# Patient Record
Sex: FEMALE | Race: WHITE | Employment: PART TIME | URBAN - NONMETROPOLITAN AREA
[De-identification: names, ages, dates, MRNs, and addresses within clinical notes are randomized per-mention and may not be internally consistent; named-entity substitution may affect disease eponyms.]

---

## 2018-03-07 ENCOUNTER — HOSPITAL ENCOUNTER (EMERGENCY)
Age: 23
Discharge: HOME | End: 2018-03-07
Payer: COMMERCIAL

## 2018-03-07 ENCOUNTER — APPOINTMENT (OUTPATIENT)
Dept: GENERAL RADIOLOGY | Age: 23
End: 2018-03-07
Payer: COMMERCIAL

## 2018-03-07 VITALS
OXYGEN SATURATION: 100 % | HEART RATE: 81 BPM | TEMPERATURE: 97.4 F | SYSTOLIC BLOOD PRESSURE: 123 MMHG | RESPIRATION RATE: 20 BRPM | DIASTOLIC BLOOD PRESSURE: 71 MMHG

## 2018-03-07 DIAGNOSIS — M77.8 FOREARM TENDONITIS: Primary | ICD-10-CM

## 2018-03-07 PROCEDURE — 73110 X-RAY EXAM OF WRIST: CPT

## 2018-03-07 PROCEDURE — 99283 EMERGENCY DEPT VISIT LOW MDM: CPT

## 2018-03-07 RX ORDER — NAPROXEN 500 MG/1
500 TABLET ORAL 2 TIMES DAILY WITH MEALS
Qty: 30 TABLET | Refills: 0 | Status: SHIPPED | OUTPATIENT
Start: 2018-03-07 | End: 2018-06-22 | Stop reason: ALTCHOICE

## 2018-03-07 ASSESSMENT — PAIN DESCRIPTION - PAIN TYPE: TYPE: ACUTE PAIN

## 2018-03-07 ASSESSMENT — PAIN DESCRIPTION - LOCATION: LOCATION: WRIST

## 2018-03-07 ASSESSMENT — PAIN DESCRIPTION - ORIENTATION: ORIENTATION: LEFT

## 2018-03-07 ASSESSMENT — PAIN SCALES - GENERAL: PAINLEVEL_OUTOF10: 5

## 2018-03-07 ASSESSMENT — ENCOUNTER SYMPTOMS: COLOR CHANGE: 0

## 2018-03-07 ASSESSMENT — PAIN DESCRIPTION - FREQUENCY: FREQUENCY: CONTINUOUS

## 2018-03-07 NOTE — ED NOTES
Patient to ED for left wrist injury. patient states last week was lifting an object and felt a pop in her wrist. Since then patient has been unable to use wrist without pain, no obvious deformities noted.      Ivis Pat RN  03/07/18 [id number]

## 2018-06-22 ENCOUNTER — HOSPITAL ENCOUNTER (EMERGENCY)
Age: 23
Discharge: HOME | End: 2018-06-22
Payer: COMMERCIAL

## 2018-06-22 ENCOUNTER — APPOINTMENT (OUTPATIENT)
Dept: CT IMAGING | Age: 23
End: 2018-06-22
Payer: COMMERCIAL

## 2018-06-22 VITALS
TEMPERATURE: 98.3 F | DIASTOLIC BLOOD PRESSURE: 80 MMHG | HEART RATE: 80 BPM | OXYGEN SATURATION: 99 % | RESPIRATION RATE: 16 BRPM | SYSTOLIC BLOOD PRESSURE: 120 MMHG | HEIGHT: 64 IN | WEIGHT: 122 LBS | BODY MASS INDEX: 20.83 KG/M2

## 2018-06-22 DIAGNOSIS — R10.10 PAIN OF UPPER ABDOMEN: Primary | ICD-10-CM

## 2018-06-22 DIAGNOSIS — K59.01 SLOW TRANSIT CONSTIPATION: ICD-10-CM

## 2018-06-22 DIAGNOSIS — R74.8 ELEVATED LIPASE: ICD-10-CM

## 2018-06-22 LAB
ALBUMIN SERPL-MCNC: 4 G/DL (ref 3.5–5.1)
ALP BLD-CCNC: 72 U/L (ref 38–126)
ALT SERPL-CCNC: 10 U/L (ref 11–66)
ANION GAP SERPL CALCULATED.3IONS-SCNC: 13 MEQ/L (ref 8–16)
AST SERPL-CCNC: 14 U/L (ref 5–40)
BACTERIA: ABNORMAL /HPF
BASOPHILS # BLD: 0.9 %
BASOPHILS ABSOLUTE: 0.1 THOU/MM3 (ref 0–0.1)
BILIRUB SERPL-MCNC: 0.2 MG/DL (ref 0.3–1.2)
BILIRUBIN DIRECT: < 0.2 MG/DL (ref 0–0.3)
BILIRUBIN URINE: NEGATIVE
BLOOD, URINE: ABNORMAL
BUN BLDV-MCNC: 9 MG/DL (ref 7–22)
CALCIUM SERPL-MCNC: 9.2 MG/DL (ref 8.5–10.5)
CASTS 2: ABNORMAL /LPF
CASTS UA: ABNORMAL /LPF
CHARACTER, URINE: CLEAR
CHLORIDE BLD-SCNC: 102 MEQ/L (ref 98–111)
CHOLESTEROL, TOTAL: 150 MG/DL (ref 100–199)
CO2: 25 MEQ/L (ref 23–33)
COLOR: YELLOW
CREAT SERPL-MCNC: 0.6 MG/DL (ref 0.4–1.2)
CRYSTALS, UA: ABNORMAL
EOSINOPHIL # BLD: 5.5 %
EOSINOPHILS ABSOLUTE: 0.7 THOU/MM3 (ref 0–0.4)
EPITHELIAL CELLS, UA: ABNORMAL /HPF
GFR SERPL CREATININE-BSD FRML MDRD: > 90 ML/MIN/1.73M2
GLUCOSE BLD-MCNC: 100 MG/DL (ref 70–108)
GLUCOSE URINE: NEGATIVE MG/DL
HCT VFR BLD CALC: 39 % (ref 37–47)
HDLC SERPL-MCNC: 46 MG/DL
HEMOGLOBIN: 13.2 GM/DL (ref 12–16)
KETONES, URINE: NEGATIVE
LDL CHOLESTEROL CALCULATED: 87 MG/DL
LEUKOCYTE ESTERASE, URINE: NEGATIVE
LIPASE: 167.2 U/L (ref 5.6–51.3)
LYMPHOCYTES # BLD: 32.1 %
LYMPHOCYTES ABSOLUTE: 3.9 THOU/MM3 (ref 1–4.8)
MCH RBC QN AUTO: 31.9 PG (ref 27–31)
MCHC RBC AUTO-ENTMCNC: 33.7 GM/DL (ref 33–37)
MCV RBC AUTO: 94.6 FL (ref 81–99)
MISCELLANEOUS 2: ABNORMAL
MONOCYTES # BLD: 7.4 %
MONOCYTES ABSOLUTE: 0.9 THOU/MM3 (ref 0.4–1.3)
NITRITE, URINE: POSITIVE
NUCLEATED RED BLOOD CELLS: 0 /100 WBC
OSMOLALITY CALCULATION: 278.2 MOSMOL/KG (ref 275–300)
PDW BLD-RTO: 13.7 % (ref 11.5–14.5)
PH UA: 7
PLATELET # BLD: 307 THOU/MM3 (ref 130–400)
PMV BLD AUTO: 8.3 FL (ref 7.4–10.4)
POTASSIUM SERPL-SCNC: 3.6 MEQ/L (ref 3.5–5.2)
PREGNANCY, URINE: NEGATIVE
PROTEIN UA: NEGATIVE
RBC # BLD: 4.12 MILL/MM3 (ref 4.2–5.4)
RBC URINE: ABNORMAL /HPF
RENAL EPITHELIAL, UA: ABNORMAL
SEG NEUTROPHILS: 54.1 %
SEGMENTED NEUTROPHILS ABSOLUTE COUNT: 6.5 THOU/MM3 (ref 1.8–7.7)
SODIUM BLD-SCNC: 140 MEQ/L (ref 135–145)
SPECIFIC GRAVITY, URINE: 1.02 (ref 1–1.03)
TOTAL PROTEIN: 7 G/DL (ref 6.1–8)
TRIGL SERPL-MCNC: 87 MG/DL (ref 0–199)
UROBILINOGEN, URINE: 0.2 EU/DL
WBC # BLD: 12.1 THOU/MM3 (ref 4.8–10.8)
WBC UA: ABNORMAL /HPF
YEAST: ABNORMAL

## 2018-06-22 PROCEDURE — 87186 SC STD MICRODIL/AGAR DIL: CPT

## 2018-06-22 PROCEDURE — 81001 URINALYSIS AUTO W/SCOPE: CPT

## 2018-06-22 PROCEDURE — 87077 CULTURE AEROBIC IDENTIFY: CPT

## 2018-06-22 PROCEDURE — 99284 EMERGENCY DEPT VISIT MOD MDM: CPT

## 2018-06-22 PROCEDURE — 96375 TX/PRO/DX INJ NEW DRUG ADDON: CPT

## 2018-06-22 PROCEDURE — 74177 CT ABD & PELVIS W/CONTRAST: CPT

## 2018-06-22 PROCEDURE — 6360000002 HC RX W HCPCS: Performed by: EMERGENCY MEDICINE

## 2018-06-22 PROCEDURE — 6370000000 HC RX 637 (ALT 250 FOR IP): Performed by: EMERGENCY MEDICINE

## 2018-06-22 PROCEDURE — 80053 COMPREHEN METABOLIC PANEL: CPT

## 2018-06-22 PROCEDURE — 96374 THER/PROPH/DIAG INJ IV PUSH: CPT

## 2018-06-22 PROCEDURE — 87086 URINE CULTURE/COLONY COUNT: CPT

## 2018-06-22 PROCEDURE — 87184 SC STD DISK METHOD PER PLATE: CPT

## 2018-06-22 PROCEDURE — 85025 COMPLETE CBC W/AUTO DIFF WBC: CPT

## 2018-06-22 PROCEDURE — 82248 BILIRUBIN DIRECT: CPT

## 2018-06-22 PROCEDURE — 6360000004 HC RX CONTRAST MEDICATION: Performed by: EMERGENCY MEDICINE

## 2018-06-22 PROCEDURE — 81025 URINE PREGNANCY TEST: CPT

## 2018-06-22 PROCEDURE — 80061 LIPID PANEL: CPT

## 2018-06-22 PROCEDURE — 36415 COLL VENOUS BLD VENIPUNCTURE: CPT

## 2018-06-22 PROCEDURE — 83690 ASSAY OF LIPASE: CPT

## 2018-06-22 RX ORDER — ONDANSETRON 2 MG/ML
4 INJECTION INTRAMUSCULAR; INTRAVENOUS ONCE
Status: COMPLETED | OUTPATIENT
Start: 2018-06-22 | End: 2018-06-22

## 2018-06-22 RX ORDER — ONDANSETRON 4 MG/1
4 TABLET, ORALLY DISINTEGRATING ORAL EVERY 8 HOURS PRN
Qty: 15 TABLET | Refills: 0 | Status: SHIPPED | OUTPATIENT
Start: 2018-06-22

## 2018-06-22 RX ORDER — DOCUSATE SODIUM 100 MG/1
100 CAPSULE, LIQUID FILLED ORAL 2 TIMES DAILY
Qty: 60 CAPSULE | Refills: 0 | Status: SHIPPED | OUTPATIENT
Start: 2018-06-22

## 2018-06-22 RX ORDER — PANTOPRAZOLE SODIUM 40 MG/1
40 TABLET, DELAYED RELEASE ORAL ONCE
Status: COMPLETED | OUTPATIENT
Start: 2018-06-22 | End: 2018-06-22

## 2018-06-22 RX ORDER — PANTOPRAZOLE SODIUM 40 MG/1
40 TABLET, DELAYED RELEASE ORAL DAILY
Qty: 30 TABLET | Refills: 0 | Status: SHIPPED | OUTPATIENT
Start: 2018-06-22 | End: 2018-06-22 | Stop reason: CLARIF

## 2018-06-22 RX ORDER — KETOROLAC TROMETHAMINE 30 MG/ML
30 INJECTION, SOLUTION INTRAMUSCULAR; INTRAVENOUS ONCE
Status: COMPLETED | OUTPATIENT
Start: 2018-06-22 | End: 2018-06-22

## 2018-06-22 RX ORDER — KETOROLAC TROMETHAMINE 10 MG/1
10 TABLET, FILM COATED ORAL EVERY 6 HOURS PRN
Qty: 20 TABLET | Refills: 0 | Status: SHIPPED | OUTPATIENT
Start: 2018-06-22

## 2018-06-22 RX ADMIN — LIDOCAINE HYDROCHLORIDE: 20 SOLUTION ORAL; TOPICAL at 15:50

## 2018-06-22 RX ADMIN — ONDANSETRON 4 MG: 2 INJECTION INTRAMUSCULAR; INTRAVENOUS at 18:57

## 2018-06-22 RX ADMIN — PANTOPRAZOLE SODIUM 40 MG: 40 TABLET, DELAYED RELEASE ORAL at 15:50

## 2018-06-22 RX ADMIN — KETOROLAC TROMETHAMINE 30 MG: 30 INJECTION, SOLUTION INTRAMUSCULAR at 18:57

## 2018-06-22 RX ADMIN — IOPAMIDOL 80 ML: 755 INJECTION, SOLUTION INTRAVENOUS at 18:35

## 2018-06-22 ASSESSMENT — PAIN DESCRIPTION - DESCRIPTORS: DESCRIPTORS: OTHER (COMMENT)

## 2018-06-22 ASSESSMENT — ENCOUNTER SYMPTOMS
WHEEZING: 0
ABDOMINAL PAIN: 1
DIARRHEA: 0
BLOOD IN STOOL: 0
CONSTIPATION: 0
NAUSEA: 0
COUGH: 0
BACK PAIN: 0
RHINORRHEA: 0
SORE THROAT: 0
VOMITING: 0
SHORTNESS OF BREATH: 0

## 2018-06-22 ASSESSMENT — PAIN SCALES - GENERAL
PAINLEVEL_OUTOF10: 3
PAINLEVEL_OUTOF10: 10
PAINLEVEL_OUTOF10: 3

## 2018-06-22 ASSESSMENT — PAIN DESCRIPTION - PROGRESSION: CLINICAL_PROGRESSION: GRADUALLY WORSENING

## 2018-06-22 ASSESSMENT — PAIN DESCRIPTION - LOCATION: LOCATION: ABDOMEN

## 2018-06-22 ASSESSMENT — PAIN DESCRIPTION - ONSET: ONSET: ON-GOING

## 2018-06-22 ASSESSMENT — PAIN DESCRIPTION - FREQUENCY: FREQUENCY: INTERMITTENT

## 2018-06-22 ASSESSMENT — PAIN DESCRIPTION - PAIN TYPE: TYPE: ACUTE PAIN

## 2018-06-24 LAB
ORGANISM: ABNORMAL
URINE CULTURE REFLEX: ABNORMAL

## 2018-07-09 ENCOUNTER — HOSPITAL ENCOUNTER (OUTPATIENT)
Age: 23
Setting detail: OUTPATIENT SURGERY
Discharge: HOME | End: 2018-07-09
Attending: INTERNAL MEDICINE | Admitting: INTERNAL MEDICINE
Payer: COMMERCIAL

## 2018-07-09 ENCOUNTER — ANESTHESIA EVENT (OUTPATIENT)
Dept: ENDOSCOPY | Age: 23
End: 2018-07-09
Payer: COMMERCIAL

## 2018-07-09 ENCOUNTER — ANESTHESIA (OUTPATIENT)
Dept: ENDOSCOPY | Age: 23
End: 2018-07-09
Payer: COMMERCIAL

## 2018-07-09 VITALS
HEART RATE: 78 BPM | DIASTOLIC BLOOD PRESSURE: 67 MMHG | OXYGEN SATURATION: 99 % | HEIGHT: 63 IN | TEMPERATURE: 97.6 F | SYSTOLIC BLOOD PRESSURE: 97 MMHG | RESPIRATION RATE: 16 BRPM | WEIGHT: 125 LBS | BODY MASS INDEX: 22.15 KG/M2

## 2018-07-09 VITALS
OXYGEN SATURATION: 97 % | SYSTOLIC BLOOD PRESSURE: 92 MMHG | RESPIRATION RATE: 6 BRPM | DIASTOLIC BLOOD PRESSURE: 65 MMHG

## 2018-07-09 LAB — PREGNANCY, URINE: NEGATIVE

## 2018-07-09 PROCEDURE — 88305 TISSUE EXAM BY PATHOLOGIST: CPT

## 2018-07-09 PROCEDURE — 2580000003 HC RX 258: Performed by: INTERNAL MEDICINE

## 2018-07-09 PROCEDURE — 7100000001 HC PACU RECOVERY - ADDTL 15 MIN: Performed by: INTERNAL MEDICINE

## 2018-07-09 PROCEDURE — 3700000001 HC ADD 15 MINUTES (ANESTHESIA): Performed by: INTERNAL MEDICINE

## 2018-07-09 PROCEDURE — 3700000000 HC ANESTHESIA ATTENDED CARE: Performed by: INTERNAL MEDICINE

## 2018-07-09 PROCEDURE — 3609012400 HC EGD TRANSORAL BIOPSY SINGLE/MULTIPLE: Performed by: INTERNAL MEDICINE

## 2018-07-09 PROCEDURE — 3609027000 HC COLONOSCOPY: Performed by: INTERNAL MEDICINE

## 2018-07-09 PROCEDURE — 7100000000 HC PACU RECOVERY - FIRST 15 MIN: Performed by: INTERNAL MEDICINE

## 2018-07-09 PROCEDURE — 81025 URINE PREGNANCY TEST: CPT

## 2018-07-09 PROCEDURE — 2500000003 HC RX 250 WO HCPCS: Performed by: NURSE ANESTHETIST, CERTIFIED REGISTERED

## 2018-07-09 PROCEDURE — 6360000002 HC RX W HCPCS: Performed by: NURSE ANESTHETIST, CERTIFIED REGISTERED

## 2018-07-09 RX ORDER — LIDOCAINE HYDROCHLORIDE 20 MG/ML
INJECTION, SOLUTION INFILTRATION; PERINEURAL PRN
Status: DISCONTINUED | OUTPATIENT
Start: 2018-07-09 | End: 2018-07-09 | Stop reason: SDUPTHER

## 2018-07-09 RX ORDER — GLYCOPYRROLATE 1 MG/5 ML
SYRINGE (ML) INTRAVENOUS PRN
Status: DISCONTINUED | OUTPATIENT
Start: 2018-07-09 | End: 2018-07-09 | Stop reason: SDUPTHER

## 2018-07-09 RX ORDER — PROPOFOL 10 MG/ML
INJECTION, EMULSION INTRAVENOUS PRN
Status: DISCONTINUED | OUTPATIENT
Start: 2018-07-09 | End: 2018-07-09 | Stop reason: SDUPTHER

## 2018-07-09 RX ORDER — SODIUM CHLORIDE 450 MG/100ML
INJECTION, SOLUTION INTRAVENOUS CONTINUOUS
Status: DISCONTINUED | OUTPATIENT
Start: 2018-07-09 | End: 2018-07-09 | Stop reason: HOSPADM

## 2018-07-09 RX ADMIN — SODIUM CHLORIDE: 4.5 INJECTION, SOLUTION INTRAVENOUS at 08:18

## 2018-07-09 RX ADMIN — LIDOCAINE HYDROCHLORIDE 50 MG: 20 INJECTION, SOLUTION INFILTRATION; PERINEURAL at 08:34

## 2018-07-09 RX ADMIN — PROPOFOL 20 MG: 10 INJECTION, EMULSION INTRAVENOUS at 08:41

## 2018-07-09 RX ADMIN — PROPOFOL 20 MG: 10 INJECTION, EMULSION INTRAVENOUS at 08:45

## 2018-07-09 RX ADMIN — PROPOFOL 20 MG: 10 INJECTION, EMULSION INTRAVENOUS at 08:39

## 2018-07-09 RX ADMIN — PROPOFOL 30 MG: 10 INJECTION, EMULSION INTRAVENOUS at 08:35

## 2018-07-09 RX ADMIN — PROPOFOL 20 MG: 10 INJECTION, EMULSION INTRAVENOUS at 08:48

## 2018-07-09 RX ADMIN — PROPOFOL 20 MG: 10 INJECTION, EMULSION INTRAVENOUS at 08:36

## 2018-07-09 RX ADMIN — PROPOFOL 20 MG: 10 INJECTION, EMULSION INTRAVENOUS at 08:43

## 2018-07-09 RX ADMIN — PROPOFOL 120 MG: 10 INJECTION, EMULSION INTRAVENOUS at 08:34

## 2018-07-09 RX ADMIN — Medication 0.2 MG: at 08:34

## 2018-07-09 ASSESSMENT — PAIN SCALES - GENERAL: PAINLEVEL_OUTOF10: 0

## 2018-07-09 ASSESSMENT — LIFESTYLE VARIABLES: SMOKING_STATUS: 1

## 2018-07-09 ASSESSMENT — PAIN - FUNCTIONAL ASSESSMENT: PAIN_FUNCTIONAL_ASSESSMENT: 0-10

## 2018-07-09 NOTE — H&P
[address] [place] [address]                         PREOPERATIVE HISTORY AND PHYSICAL    PATIENT NAME: Glenis Vergara                  :        1995  MED REC NO:   [id number]                           ROOM:  ACCOUNT NO:   [de-identified]                           ADMIT DATE: 2018  PROVIDER:     Tray Munoz M.D.    Marlin Hernandez OF PROCEDURE:  2018. INDICATION:  The patient is a 64-year-old  complains of epigastric  abdominal pain, nonradiating pain associated with nausea, no vomiting. Complains of constipation. Denied any blood in the stool or black stools. The patient is undergoing further evaluation. PAST MEDICAL HISTORY:  Anemia. PAST SURGICAL HISTORY:  Bladder suspension, tonsillectomy, tubal ligation. MEDICATIONS:  Reviewed. PHYSICAL EXAMINATION:  VITAL SIGNS:  Afebrile. Vital signs are stable. Please see electronic  medical records for details. HEART:  Regular. Normal S1 and S2. No S3.  LUNGS:  Equal to expansion on inspection. ABDOMEN:  Soft, normoactive bowel sounds. Nontender, nondistended. IMPRESSION:  A 20year-old pleasant female who has abdominal pain,  constipation. She is undergoing further evaluation. RECOMMENDATIONS:  Keep the patient nothing by mouth. Proceed with EGD and  colonoscopy as planned. The risks including but not limited to  perforation, bleeding, infection, adverse reaction to medicine, very slight  chance of missing significant lesions. The patient expressed her  understanding. Further plans pending the above test results.         Florene Goltz, M.D.    D: 2018 8:02:11       T: 2018 8:03:22     [email or web address]  Job#: [id number]     Doc#: [id number]    CC:

## 2018-07-09 NOTE — ANESTHESIA PRE PROCEDURE
Department of Anesthesiology  Preprocedure Note       Name:  Barbara Urena   Age:  20 y.o.  :  1995                                          MRN:  [id number]         Date:  2018      Surgeon: Samantha Wood):  Kelly Scales MD    Procedure: Procedure(s):  COLONOSCOPY  EGD    Medications prior to admission:   Prior to Admission medications    Medication Sig Start Date End Date Taking? Authorizing Provider   RaNITidine HCl (ZANTAC PO) Take by mouth   Yes Historical Provider, MD   ondansetron (ZOFRAN ODT) 4 MG disintegrating tablet Take 1 tablet by mouth every 8 hours as needed for Nausea or Vomiting 18  Yes MARIN Lange CNP   ketorolac (TORADOL) 10 MG tablet Take 1 tablet by mouth every 6 hours as needed for Pain 18  Yes MARIN Lange CNP   docusate sodium (COLACE) 100 MG capsule Take 1 capsule by mouth 2 times daily 18  Yes MARIN Lange CNP   ondansetron (ZOFRAN ODT) 4 MG disintegrating tablet Take 1 tablet by mouth every 8 hours as needed for Nausea 16  Yes MARIN Damon CNP   ranitidine (ZANTAC) 150 MG tablet Take 1 tablet by mouth 2 times daily for 15 days 6/9/15 6/24/15  Vinny Diaz MD       Current medications:    Current Facility-Administered Medications   Medication Dose Route Frequency Provider Last Rate Last Dose    0.45 % sodium chloride infusion   Intravenous Continuous Kelly Scales MD 75 mL/hr at 18 0818         Allergies: Allergies   Allergen Reactions    Nutritional Supplements Swelling       Problem List:  There is no problem list on file for this patient.       Past Medical History:        Diagnosis Date    Anemia        Past Surgical History:        Procedure Laterality Date    BLADDER REPAIR      COLONOSCOPY  2018    TONSILLECTOMY      TUBAL LIGATION      UPPER GASTROINTESTINAL ENDOSCOPY  2018       Social History:    Social History   Substance Use Topics    Smoking status: Current Every Day Smoker     Packs/day: 1.00     Types: Cigarettes    Smokeless tobacco: Not on file    Alcohol use 3.6 oz/week     6 Cans of beer per week      Comment: social                                Ready to quit: Not Answered  Counseling given: Not Answered      Vital Signs (Current):   Vitals:    07/09/18 0810   BP: 92/65   Pulse: 80   Resp: 16   Temp: 36.8 °C (98.2 °F)   TempSrc: Temporal   SpO2: 95%   Weight: 125 lb (56.7 kg)   Height: 5' 3\" (1.6 m)                                              BP Readings from Last 3 Encounters:   07/09/18 92/65   06/22/18 120/80   03/07/18 123/71       NPO Status: Time of last liquid consumption: 0400                        Time of last solid consumption: 2100                        Date of last liquid consumption: 07/09/18                        Date of last solid food consumption: 07/07/18    BMI:   Wt Readings from Last 3 Encounters:   07/09/18 125 lb (56.7 kg)   06/22/18 122 lb (55.3 kg)   12/31/16 130 lb (59 kg)     Body mass index is 22.14 kg/m². CBC:   Lab Results   Component Value Date    WBC 12.1 06/22/2018    RBC 4.12 06/22/2018    HGB 13.2 06/22/2018    HCT 39.0 06/22/2018    MCV 94.6 06/22/2018    RDW 13.7 06/22/2018     06/22/2018       CMP:   Lab Results   Component Value Date     06/22/2018    K 3.6 06/22/2018     06/22/2018    CO2 25 06/22/2018    BUN 9 06/22/2018    CREATININE 0.6 06/22/2018    LABGLOM >90 06/22/2018    GLUCOSE 100 06/22/2018    PROT 7.0 06/22/2018    CALCIUM 9.2 06/22/2018    BILITOT 0.2 06/22/2018    ALKPHOS 72 06/22/2018    AST 14 06/22/2018    ALT 10 06/22/2018       POC Tests: No results for input(s): POCGLU, POCNA, POCK, POCCL, POCBUN, POCHEMO, POCHCT in the last 72 hours.     Coags: No results found for: PROTIME, INR, APTT    HCG (If Applicable):   Lab Results   Component Value Date    PREGTESTUR NEGATIVE 07/09/2018        ABGs: No results found for: PHART, PO2ART, BRN0NFH, QAJ1DML, BEART, K7GVHAAL     Type & Screen (If Applicable):  Lab Results   Component Value Date    LABRH Rh Positive 11/06/2013       Anesthesia Evaluation  Patient summary reviewed and Nursing notes reviewed  Airway:   TM distance: >3 FB   Neck ROM: full  Mouth opening: > = 3 FB Dental:          Pulmonary:   (+) current smoker          Patient smoked on day of surgery. Cardiovascular:  Exercise tolerance: good (>4 METS),         ECG reviewed  Rhythm: regular             Beta Blocker:  Not on Beta Blocker         Neuro/Psych:               GI/Hepatic/Renal:             Endo/Other:                     Abdominal:           Vascular:                                        Anesthesia Plan      MAC     ASA 2       Induction: intravenous. Anesthetic plan and risks discussed with patient. Plan discussed with CRNA and attending.                   MARIN Bingham - JORDON   7/9/2018

## 2018-07-09 NOTE — OP NOTE
[facility] Endoscopy    CONSCIOUS SEDATION      Patient: Nicole Sanchez  : 1995  Acct#: [de-identified]    PLANNED PROCEDURE   [x]EGD  [x]Colonoscopy []Flex Sigmoid  []Other:  Indication: Pain control for GI procedure    Consent: I have discussed with the patient and/or the patient representative the indication, alternatives, and the possible risks and/or complications of the planned procedure and the anesthesia methods. The patient and/or patient representative appear to understand and agree to proceed. Pre-Sedation Documentation and Exam: I have personally completed a history, physical exam & review of systems for this patient (see notes). Airway Assessment: mac    Prior History of Anesthesia Complications: mac    ASA Classification: mac    Sedation/ Anesthesia Plan: mac      SEDATION  Planned agent:[x]Midazolam []Meperidine [x]Sublimaze []Morphine    Monitoring and Safety: The patient will be placed on a cardiac monitor and vital signs, pulse oximetry and level of consciousness will be continuously evaluated throughout the procedure. The patient will be closely monitored until recovery from the medications is complete and the patient has returned to baseline status. Respiratory therapy will be on standby during the procedure. I have reviewed with the patient and/or family the risks, benefits, and alternatives to the procedure.     ABENA Muñiz MD  2018, 8:01 AM    Post-Sedation Vital Signs: Vital signs were reviewed and were stable after the procedure (see flow sheet for vitals)            Post-Sedation Exam: Lungs: clear           Complications: none     ABENA Muñiz MD  2018,

## 2018-07-10 NOTE — OP NOTE
[address] [place] [address]                                 OPERATIVE REPORT    PATIENT NAME: Kiki Daniels                  :        1995  MED REC NO:   [id number]                           ROOM:  ACCOUNT NO:   [de-identified]                           ADMIT DATE: 2018  PROVIDER:     Fran Lawrence M.D.    Yuliet Alston OF PROCEDURE:  2018    PROCEDURES:  Esophagogastroduodenoscopy and colonoscopy. SURGEON:  Fran Lawrence M.D. INDICATION:  The patient is a 39-year-old pleasant female who has  epigastric abdominal pain and constipation. She is undergoing further  evaluation. Please see my brief history for details and for physical  examination. ASA CLASSIFICATION:  As per Anesthesia. Please see Anesthesia note for  details. INSTRUMENT:  PCF-H190AL pediatric colonoscope. PHOTOGRAPHS:  Yes. BIOPSIES:  Yes. DESCRIPTION OF PROCEDURE:  Procedure indications and complications  including but not limited to perforation, bleeding, infection, adverse  reaction to medicine, and very slight chance of missing significant lesions  were discussed with the patient. The patient expressed her understanding  and a written consent was obtained. The patient was placed in the left lateral decubitus position in the Endo  room #13. The patient was placed on appropriate monitoring of vitals  including blood pressure, heart rate, and pulse ox. ESOPHAGOGASTRODUODENOSCOPY REPORT:  Oropharynx was sprayed with Cetacaine  spray and bite-block was placed between maxilla and mandible after the  adequate total intravenous anesthesia was given by Anesthesia. The  PCF-H190AL pediatric gastroscope was inserted into the oropharynx and the  esophagus was intubated under direct vision. On careful inspection and  advancement of the scope, undigested food in the stomach was noted as shown  in picture #A6 and #A7.   Scope was advanced down through the stomach into  second portion of duodenum. On careful inspection and on withdrawal of the  scope, the duodenum looks normal as shown in picture #A4 and #A5. Multiple  biopsies were obtained to rule out malabsorption syndrome. Antrum of the  stomach looks normal as shown in picture #A3. In the body of the stomach,  undigested food layered in the greater curvature of the stomach as shown in  picture #A6 and the food also layered in the fundus of the stomach as shown  in picture #A7. The patient had intact squamocolumnar junction at the  gastroesophageal junction noted as shown in picture #A2. Mid and proximal  esophagus look normal as shown in picture #A1. Air was withdrawn as the  scope was removed. The patient tolerated the procedure well without any  immediate complications. COLONOSCOPY REPORT:  The patient's position was changed after the rectal  examination, continued on total intravenous anesthesia. The PCF-H190AL  pediatric colonoscope was inserted into the rectum, advanced up to the  cecum without any difficulty and terminal ileum was intubated. On careful  inspection, the preparation was good. On withdrawal of the scope, the  terminal ileum looks normal as shown in picture #A2. Appendiceal orifice  and cecum look normal as shown in picture #A1. Ascending colon looks  normal as shown in picture #A3. Transverse colon looks normal as shown in  picture #A4 and #A5. Descending colon looks normal as shown in picture  #A6. Sigmoid colon looks normal as shown in picture #A7. On retroflex,  rectum showed small internal hemorrhoids as shown in picture #A8. Air was  withdrawn as the scope was removed. The patient tolerated the procedure  well without any immediate complications. Vitals including blood pressure,  heart rate, and pulse ox were stable during the procedure and after the  procedure. The patient was transferred to the recovery room in stable  condition.     IMPRESSION:

## 2018-08-01 ENCOUNTER — HOSPITAL ENCOUNTER (OUTPATIENT)
Dept: GENERAL RADIOLOGY | Age: 23
Discharge: HOME | End: 2018-08-01
Payer: COMMERCIAL

## 2018-08-01 DIAGNOSIS — R10.13 EPIGASTRIC PAIN: ICD-10-CM

## 2018-08-01 DIAGNOSIS — K59.00 CONSTIPATION, UNSPECIFIED CONSTIPATION TYPE: ICD-10-CM

## 2018-08-01 PROCEDURE — 2500000003 HC RX 250 WO HCPCS: Performed by: INTERNAL MEDICINE

## 2018-08-01 PROCEDURE — 74250 X-RAY XM SM INT 1CNTRST STD: CPT

## 2018-08-01 RX ADMIN — BARIUM SULFATE 600 ML: 240 SUSPENSION ORAL at 09:31

## 2018-12-01 ENCOUNTER — HOSPITAL ENCOUNTER (EMERGENCY)
Age: 23
Discharge: HOME | End: 2018-12-01
Payer: COMMERCIAL

## 2018-12-01 VITALS
TEMPERATURE: 98.4 F | RESPIRATION RATE: 18 BRPM | DIASTOLIC BLOOD PRESSURE: 84 MMHG | HEART RATE: 86 BPM | SYSTOLIC BLOOD PRESSURE: 119 MMHG | OXYGEN SATURATION: 98 %

## 2018-12-01 DIAGNOSIS — S61.011A LACERATION OF RIGHT THUMB WITHOUT FOREIGN BODY WITHOUT DAMAGE TO NAIL, INITIAL ENCOUNTER: Primary | ICD-10-CM

## 2018-12-01 PROCEDURE — 12001 RPR S/N/AX/GEN/TRNK 2.5CM/<: CPT

## 2018-12-01 PROCEDURE — 2709999900 HC NON-CHARGEABLE SUPPLY

## 2018-12-01 PROCEDURE — 99282 EMERGENCY DEPT VISIT SF MDM: CPT

## 2018-12-01 RX ORDER — CEPHALEXIN 500 MG/1
500 CAPSULE ORAL 3 TIMES DAILY
Qty: 21 CAPSULE | Refills: 0 | Status: SHIPPED | OUTPATIENT
Start: 2018-12-01 | End: 2018-12-08

## 2018-12-01 ASSESSMENT — ENCOUNTER SYMPTOMS: COLOR CHANGE: 0

## 2018-12-02 NOTE — ED TRIAGE NOTES
Presents to ED for thumb laceration states that she doesn't want stiches states that she is is not up to date on her tetanus shot and doesn't want one notified provider

## 2021-02-24 ENCOUNTER — HOSPITAL ENCOUNTER (EMERGENCY)
Age: 26
Discharge: HOME | End: 2021-02-24
Payer: COMMERCIAL

## 2021-02-24 VITALS
DIASTOLIC BLOOD PRESSURE: 85 MMHG | SYSTOLIC BLOOD PRESSURE: 135 MMHG | WEIGHT: 140 LBS | RESPIRATION RATE: 20 BRPM | HEART RATE: 98 BPM | HEIGHT: 64 IN | OXYGEN SATURATION: 99 % | BODY MASS INDEX: 23.9 KG/M2 | TEMPERATURE: 97.2 F

## 2021-02-24 DIAGNOSIS — F41.0 PANIC ATTACK: Primary | ICD-10-CM

## 2021-02-24 LAB
EKG ATRIAL RATE: 101 BPM
EKG P AXIS: 71 DEGREES
EKG P-R INTERVAL: 136 MS
EKG Q-T INTERVAL: 346 MS
EKG QRS DURATION: 84 MS
EKG QTC CALCULATION (BAZETT): 448 MS
EKG R AXIS: 81 DEGREES
EKG T AXIS: 32 DEGREES
EKG VENTRICULAR RATE: 101 BPM

## 2021-02-24 PROCEDURE — 6360000002 HC RX W HCPCS: Performed by: NURSE PRACTITIONER

## 2021-02-24 PROCEDURE — 99282 EMERGENCY DEPT VISIT SF MDM: CPT

## 2021-02-24 PROCEDURE — 96372 THER/PROPH/DIAG INJ SC/IM: CPT

## 2021-02-24 PROCEDURE — 93005 ELECTROCARDIOGRAM TRACING: CPT | Performed by: NURSE PRACTITIONER

## 2021-02-24 PROCEDURE — 93010 ELECTROCARDIOGRAM REPORT: CPT | Performed by: NUCLEAR MEDICINE

## 2021-02-24 RX ORDER — HYDROXYZINE HYDROCHLORIDE 50 MG/ML
50 INJECTION, SOLUTION INTRAMUSCULAR ONCE
Status: COMPLETED | OUTPATIENT
Start: 2021-02-24 | End: 2021-02-24

## 2021-02-24 RX ORDER — HYDROXYZINE PAMOATE 25 MG/1
25 CAPSULE ORAL 3 TIMES DAILY PRN
Qty: 40 CAPSULE | Refills: 0 | Status: SHIPPED | OUTPATIENT
Start: 2021-02-24 | End: 2021-03-10

## 2021-02-24 RX ADMIN — HYDROXYZINE HYDROCHLORIDE 50 MG: 50 INJECTION, SOLUTION INTRAMUSCULAR at 12:11

## 2021-02-24 NOTE — ED NOTES
Pt presents to the ER with a panic attack. Pt states that it starts out that she is having a hard time breathing and then her arms curl to her chest and she starts shaking all over. Pt states that this is the 2nd time this has happened and last time she was diagnosed with pneumonia and now she is on a steroid.      Paulo Escort  02/24/21 1044

## 2021-02-24 NOTE — ED NOTES
Pt states Vistaril helped. Pt has no complaints at this time.       Joselyn Bryan, BIB  02/24/21 1300

## 2021-02-25 ASSESSMENT — ENCOUNTER SYMPTOMS
DIARRHEA: 0
TROUBLE SWALLOWING: 0
RHINORRHEA: 0
SORE THROAT: 0
CHEST TIGHTNESS: 0
SHORTNESS OF BREATH: 1
BACK PAIN: 0
COLOR CHANGE: 0
PHOTOPHOBIA: 0
COUGH: 0
SINUS PRESSURE: 0
WHEEZING: 0
NAUSEA: 0
SINUS PAIN: 0
ABDOMINAL PAIN: 0
CONSTIPATION: 0
VOMITING: 0

## 2021-02-25 NOTE — ED PROVIDER NOTES
[address]          CHIEF COMPLAINT       Chief Complaint   Patient presents with    Panic Attack       Nurses Notes reviewed and I agree except as noted in the HPI. HISTORY OF PRESENT ILLNESS    Elham Pruitt is a 30 y.o. female who presents to the Emergency Department for the evaluation of shortness of breath, arm numbness and tingling and cramping, possible panic attack. Patient states that she has had 2 episodes like this in the past week, with previous episodes she was taken to [facility], blood work, EKG, chest x-ray were all completed and patient was diagnosed with pneumonia and placed on antibiotics. She was told that the pneumonia is probably what caused her shortness of breath. Patient states that with today's episode she began hyperventilating, her arms got numb and tingly and crampy, she went to her grandmother's house as she was told that she should not be alone when she has these episodes. He does have a history of anxiety, previously has seen counselor, has had increased stress due to custody keane      The HPI was provided by the patient. REVIEW OF SYSTEMS     Review of Systems   Constitutional: Negative for chills, diaphoresis, fatigue and fever. HENT: Negative for congestion, ear pain, nosebleeds, rhinorrhea, sinus pressure, sinus pain, sore throat and trouble swallowing. Eyes: Negative for photophobia. Respiratory: Positive for shortness of breath. Negative for cough, chest tightness and wheezing. Cardiovascular: Negative for chest pain and palpitations. Gastrointestinal: Negative for abdominal pain, constipation, diarrhea, nausea and vomiting. Endocrine: Negative for cold intolerance and heat intolerance. Genitourinary: Negative for difficulty urinating, dysuria, flank pain, hematuria, pelvic pain, vaginal bleeding, vaginal discharge and vaginal pain.    Musculoskeletal: Negative for arthralgias, back pain, joint swelling, myalgias and neck stiffness. Skin: Negative for color change and wound. Neurological: Positive for tremors and numbness. Negative for dizziness, weakness, light-headedness and headaches. Psychiatric/Behavioral: Negative for agitation, behavioral problems, confusion, hallucinations, self-injury and suicidal ideas. The patient is not nervous/anxious. PAST MEDICAL HISTORY    has a past medical history of Anemia. SURGICAL HISTORY      has a past surgical history that includes Tonsillectomy; bladder repair; Tubal ligation; Colonoscopy (07/09/2018); Upper gastrointestinal endoscopy (07/09/2018); pr colonoscopy flx dx w/collj spec when pfrmd (N/A, 7/9/2018); and Upper gastrointestinal endoscopy (N/A, 7/9/2018). CURRENT MEDICATIONS       Discharge Medication List as of 2/24/2021 12:39 PM      CONTINUE these medications which have NOT CHANGED    Details   RaNITidine HCl (ZANTAC PO) Take by mouthHistorical Med      !! ondansetron (ZOFRAN ODT) 4 MG disintegrating tablet Take 1 tablet by mouth every 8 hours as needed for Nausea or Vomiting, Disp-15 tablet, R-0Print      ketorolac (TORADOL) 10 MG tablet Take 1 tablet by mouth every 6 hours as needed for Pain, Disp-20 tablet, R-0Print      docusate sodium (COLACE) 100 MG capsule Take 1 capsule by mouth 2 times daily, Disp-60 capsule, R-0Print      !! ondansetron (ZOFRAN ODT) 4 MG disintegrating tablet Take 1 tablet by mouth every 8 hours as needed for Nausea, Disp-20 tablet, R-0       !! - Potential duplicate medications found. Please discuss with provider. ALLERGIES     is allergic to nutritional supplements. FAMILY HISTORY     She indicated that her mother is alive. She indicated that her father is alive. She indicated that the status of her paternal grandfather is unknown.   family history includes High Blood Pressure in her paternal grandfather. SOCIAL HISTORY      reports that she has been smoking cigarettes.  She has been smoking about 1.00 pack per day. She does not have any smokeless tobacco history on file. She reports current alcohol use of about 6.0 standard drinks of alcohol per week. She reports current drug use. Frequency: 3.00 times per week. Drug: Marijuana. PHYSICAL EXAM     INITIAL VITALS:  height is 5' 4\" (1.626 m) and weight is 140 lb (63.5 kg). Her oral temperature is 97.2 °F (36.2 °C). Her blood pressure is 135/85 and her pulse is 98. Her respiration is 20 and oxygen saturation is 99%. Physical Exam  Vitals signs and nursing note reviewed. Constitutional:       General: She is awake. She is not in acute distress. Appearance: Normal appearance. She is well-developed and normal weight. She is not ill-appearing, toxic-appearing or diaphoretic. HENT:      Head: Normocephalic and atraumatic. Right Ear: Tympanic membrane normal.      Left Ear: Tympanic membrane normal.      Nose: Nose normal.      Mouth/Throat:      Mouth: Mucous membranes are moist.      Pharynx: Oropharynx is clear. Eyes:      Extraocular Movements: Extraocular movements intact. Pupils: Pupils are equal, round, and reactive to light. Neck:      Musculoskeletal: Normal range of motion and neck supple. No neck rigidity or muscular tenderness. Vascular: No carotid bruit. Cardiovascular:      Rate and Rhythm: Normal rate and regular rhythm. Pulses: Normal pulses. Heart sounds: Normal heart sounds, S1 normal and S2 normal. Heart sounds not distant. No murmur. No friction rub. No gallop. Pulmonary:      Effort: Pulmonary effort is normal. No tachypnea, bradypnea, accessory muscle usage, prolonged expiration, respiratory distress or retractions. Breath sounds: Normal breath sounds. Abdominal:      General: Abdomen is flat. Bowel sounds are normal. There is no distension or abdominal bruit. There are no signs of injury. Palpations: Abdomen is soft.  There is no shifting dullness, fluid wave, hepatomegaly, the room her symptoms had resolved and she was calm and processing what had happened. She is medicated with Vistaril and reports that she believes this helps. She is going to start seeing a counselor next week. I do not believe any further intervention is needed today. Vitals were reviewed and were within acceptable limits. Patient remained asymptomatic throughout ED stay. Return criteria was discussed and she was discharged in stable condition    CRITICAL CARE:   None    CONSULTS:  None    PROCEDURES:  None    FINAL IMPRESSION      1. Panic attack          DISPOSITION/PLAN   Discharge    PATIENT REFERRED TO:  MD Felix Minor [address] [place] [address]  [phone]    Schedule an appointment as soon as possible for a visit         DISCHARGE MEDICATIONS:  Discharge Medication List as of 2/24/2021 12:39 PM      START taking these medications    Details   hydrOXYzine (VISTARIL) 25 MG capsule Take 1 capsule by mouth 3 times daily as needed for Anxiety, Disp-40 capsule, R-0Print             (Please note that portions of this note were completed with a voice recognition program.  Efforts were made to edit the dictations but occasionally words are mis-transcribed.)    The patient was given an opportunity to see the Emergency Attending. The patient voiced understanding that I was a Mid-LevelProvider and was in agreement with being seen independently by myself. Provider:  I personally performed the services described in the documentation, reviewed and edited the documentation which was dictated to the scribe in my presence, and it accurately records my words and actions.     MARIN Voss CNP, 2/25/21, 9:58 AM        MARIN Voss CNP  02/25/21 [id number]

## 2021-07-01 ENCOUNTER — HOSPITAL ENCOUNTER (EMERGENCY)
Age: 26
Discharge: HOME | End: 2021-07-01
Payer: COMMERCIAL

## 2021-07-01 VITALS
BODY MASS INDEX: 23.9 KG/M2 | WEIGHT: 140 LBS | SYSTOLIC BLOOD PRESSURE: 141 MMHG | TEMPERATURE: 99.3 F | HEIGHT: 64 IN | RESPIRATION RATE: 16 BRPM | OXYGEN SATURATION: 99 % | DIASTOLIC BLOOD PRESSURE: 71 MMHG | HEART RATE: 90 BPM

## 2021-07-01 DIAGNOSIS — H60.391 INFECTIVE OTITIS EXTERNA OF RIGHT EAR: Primary | ICD-10-CM

## 2021-07-01 PROCEDURE — 99202 OFFICE O/P NEW SF 15 MIN: CPT

## 2021-07-01 PROCEDURE — 99203 OFFICE O/P NEW LOW 30 MIN: CPT | Performed by: NURSE PRACTITIONER

## 2021-07-01 RX ORDER — OFLOXACIN 3 MG/ML
5 SOLUTION AURICULAR (OTIC) 2 TIMES DAILY
Qty: 5 ML | Refills: 0 | Status: SHIPPED | OUTPATIENT
Start: 2021-07-01 | End: 2021-07-11

## 2021-07-01 ASSESSMENT — PAIN DESCRIPTION - LOCATION: LOCATION: EAR

## 2021-07-01 ASSESSMENT — PAIN DESCRIPTION - ORIENTATION: ORIENTATION: RIGHT

## 2021-07-01 ASSESSMENT — ENCOUNTER SYMPTOMS
SHORTNESS OF BREATH: 0
NAUSEA: 0
SORE THROAT: 0
COUGH: 0
VOMITING: 0

## 2021-07-01 ASSESSMENT — PAIN SCALES - GENERAL: PAINLEVEL_OUTOF10: 7

## 2021-07-01 NOTE — ED PROVIDER NOTES
[facility]  Urgent Care Encounter       CHIEF COMPLAINT       Chief Complaint   Patient presents with    Otalgia     right  \" I have pool\"       Nurses Notes reviewed and I agree except as noted in the HPI. HISTORY OF PRESENT ILLNESS   George Salazar is a 27 y.o. female who presents for evaluation of right ear pain that has been ongoing for the past week. Patient denies any cough, congestion, runny nose. States that she has been using over-the-counter eardrops which do provide some relief but the symptoms then returned. The history is provided by the patient. REVIEW OF SYSTEMS     Review of Systems   Constitutional: Negative for chills and fever. HENT: Positive for ear pain. Negative for congestion, ear discharge and sore throat. Respiratory: Negative for cough and shortness of breath. Cardiovascular: Negative for chest pain. Gastrointestinal: Negative for nausea and vomiting. Musculoskeletal: Negative for arthralgias and myalgias. Skin: Negative for rash. Allergic/Immunologic: Negative for environmental allergies. Neurological: Negative for headaches. PAST MEDICAL HISTORY         Diagnosis Date    Anemia     Anxiety     IBS (irritable bowel syndrome)     PTSD (post-traumatic stress disorder)        SURGICALHISTORY     Patient  has a past surgical history that includes Tonsillectomy; bladder repair; Tubal ligation; Colonoscopy (07/09/2018); Upper gastrointestinal endoscopy (07/09/2018); pr colonoscopy flx dx w/collj spec when pfrmd (N/A, 7/9/2018); and Upper gastrointestinal endoscopy (N/A, 7/9/2018). CURRENT MEDICATIONS       Discharge Medication List as of 7/1/2021  1:35 PM      CONTINUE these medications which have NOT CHANGED    Details   Sertraline HCl (ZOLOFT PO) Take by mouthHistorical Med      UNKNOWN TO PATIENT Indications: antihistamine for anxiety Historical Med      medical marijuana Take by mouth as needed. Historical Med RaNITidine HCl (ZANTAC PO) Take by mouthHistorical Med      !! ondansetron (ZOFRAN ODT) 4 MG disintegrating tablet Take 1 tablet by mouth every 8 hours as needed for Nausea or Vomiting, Disp-15 tablet, R-0Print      ketorolac (TORADOL) 10 MG tablet Take 1 tablet by mouth every 6 hours as needed for Pain, Disp-20 tablet, R-0Print      docusate sodium (COLACE) 100 MG capsule Take 1 capsule by mouth 2 times daily, Disp-60 capsule, R-0Print      !! ondansetron (ZOFRAN ODT) 4 MG disintegrating tablet Take 1 tablet by mouth every 8 hours as needed for Nausea, Disp-20 tablet, R-0       !! - Potential duplicate medications found. Please discuss with provider. ALLERGIES     Patient is is allergic to nutritional supplements. Patients   Immunization History   Administered Date(s) Administered    PPD Test 07/14/2012       FAMILY HISTORY     Patient's family history includes High Blood Pressure in her paternal grandfather; Other in her mother. SOCIAL HISTORY     Patient  reports that she has been smoking cigarettes. She has been smoking about 1.00 pack per day. She has never used smokeless tobacco. She reports current alcohol use of about 6.0 standard drinks of alcohol per week. She reports current drug use. Frequency: 3.00 times per week. Drug: Marijuana. PHYSICAL EXAM     ED TRIAGE VITALS  BP: (!) 141/71, Temp: 99.3 °F (37.4 °C), Pulse: 90, Resp: 16, SpO2: 99 %,Estimated body mass index is 24.03 kg/m² as calculated from the following:    Height as of this encounter: 5' 4\" (1.626 m). Weight as of this encounter: 140 lb (63.5 kg). ,Patient's last menstrual period was 06/30/2021. Physical Exam  Vitals and nursing note reviewed. Constitutional:       General: She is not in acute distress. Appearance: She is well-developed. She is not diaphoretic. HENT:      Right Ear: Tympanic membrane normal. Swelling and tenderness present.       Left Ear: Tympanic membrane and ear canal normal. Mouth/Throat:      Mouth: Mucous membranes are moist.      Pharynx: Oropharynx is clear. Eyes:      Conjunctiva/sclera:      Right eye: Right conjunctiva is not injected. Left eye: Left conjunctiva is not injected. Pupils: Pupils are equal.   Cardiovascular:      Rate and Rhythm: Normal rate and regular rhythm. Heart sounds: No murmur heard. Pulmonary:      Effort: Pulmonary effort is normal. No respiratory distress. Breath sounds: Normal breath sounds. Musculoskeletal:      Cervical back: Normal range of motion. Right knee: Normal range of motion. Left knee: Normal range of motion. Skin:     General: Skin is warm. Findings: No rash. Neurological:      Mental Status: She is alert and oriented to person, place, and time. Psychiatric:         Behavior: Behavior normal.         DIAGNOSTIC RESULTS     Labs:No results found for this visit on 07/01/21. IMAGING:    No orders to display         EKG:  none    URGENT CARE COURSE:     Vitals:    07/01/21 1314   BP: (!) 141/71   Pulse: 90   Resp: 16   Temp: 99.3 °F (37.4 °C)   SpO2: 99%   Weight: 140 lb (63.5 kg)   Height: 5' 4\" (1.626 m)       Medications - No data to display         PROCEDURES:  None    FINAL IMPRESSION      1. Infective otitis externa of right ear          DISPOSITION/ PLAN     Exam is consistent with otitis externa at this time. Discussed with the patient plan to treat with topical antibiotic drops and she is advised to keep her ear clean and dry at home. She is agreeable to plan as discussed.       [facility]:  [facility] [id number]      DISCHARGE MEDICATIONS:  Discharge Medication List as of 7/1/2021  1:35 PM      START taking these medications    Details   ofloxacin (FLOXIN) 0.3 % otic solution Place 5 drops into the right ear 2 times daily for 10 days, Disp-5 mL, R-0Normal             Discharge Medication List as of 7/1/2021  1:35 PM Discharge Medication List as of 7/1/2021  1:35 PM          MARIN Rivera CNP    (Please note that portions of this note were completed with a voice recognition program. Efforts were made to edit the dictations but occasionally words are mis-transcribed.)          MARIN Rivera CNP  07/01/21 4425

## 2021-07-01 NOTE — ED NOTES
Pt on phone when called to room and states \" sorry this is custody related ill be just a minute\"     Sol Saucedo RN  07/01/21 4380

## (undated) DEVICE — SET LNR RED GRN W/ BASE CLEANASCOPE

## (undated) DEVICE — FORCEPS BX L240CM JAW DIA3.2MM L CAP W/ NDL MIC MESH TOOTH

## (undated) DEVICE — ENDO KIT: Brand: MEDLINE INDUSTRIES, INC.

## (undated) DEVICE — CONNECTOR TBNG AUX H2O JET DISP FOR OLY 160/180 SER

## (undated) DEVICE — CONMED SCOPE SAVER BITE BLOCK, 20X27 MM: Brand: SCOPE SAVER